# Patient Record
Sex: MALE | URBAN - METROPOLITAN AREA
[De-identification: names, ages, dates, MRNs, and addresses within clinical notes are randomized per-mention and may not be internally consistent; named-entity substitution may affect disease eponyms.]

---

## 2018-11-08 ENCOUNTER — TELEPHONE (OUTPATIENT)
Dept: TRANSPLANT | Facility: CLINIC | Age: 63
End: 2018-11-08

## 2018-11-08 NOTE — TELEPHONE ENCOUNTER
Reviewed referral records with Dr. Duran.  He would like to consult patient with urine tox and donald screens, PFT's, 6MWT, and 2D Echo w/CFD.  CT report from 8/2018 to be faxed by nurse.

## 2018-11-08 NOTE — LETTER
11/26/2018    [address]  [facility] [address]  Phone: [phone]  Fax: [phone]         Dear Dr. Ally Ortiz    Patient: Yahir Glass     MR Number: [id number]     YOB: 1955       Thank you for the referral of Yahir Glass to our lung transplant program. We have been unable to reach the patient regarding scheduling a consultation appointment in the lung transplant clinic.  Please have Mr. Glass contact us if he is interested in scheduling.    Thank you again for your trust in our program. If there is anything we can do for you or your staff, please feel free to contact us at [phone].    Sincerely,         Vincent Duran MD   Director, Lung Transplantation   Pulmonary & Critical Care Medicine    [facility]  [address] [place] [id number]  [phone]